# Patient Record
(demographics unavailable — no encounter records)

---

## 2024-10-10 NOTE — PHYSICAL EXAM
[Chaperone Present] : A chaperone was present in the examining room during all aspects of the physical examination [13699] : A chaperone was present during the pelvic exam. [Awake] : awake [Alert] : alert [Acute Distress] : no acute distress [Soft] : soft [Tender] : non tender [Oriented x3] : oriented to person, place, and time [Normal] : uterus [No Bleeding] : there was no active vaginal bleeding [Uterine Adnexae] : were not tender and not enlarged

## 2024-10-16 NOTE — REASON FOR VISIT
[Follow-Up] : a follow-up evaluation of [FreeTextEntry2] : pelvic pain.  The patient presents for results of a pelvic sonogram.  She has a history of painful menstrual periods and also complains of pain on intercourse.

## 2024-10-16 NOTE — RESULTS/DATA
[TextEntry] : pelvic sonogram-uterus-8.7 x 5.4 x 3.5 cm, endometrium-1.6 cm in thickness, right ovary-3.5 x 2.2 x 1.9 cm, left ovary-2.5 x 2.8 x 1.9 cm, stable echogenic focus-0.6 x 0.5 x 0.6 cm probable small dermoid cyst

## 2024-10-16 NOTE — PLAN
[FreeTextEntry1] : Discussed doing a laparoscopy.  The patient will consider.  She was instructed to keep a symptom diary.